# Patient Record
Sex: MALE | Race: WHITE
[De-identification: names, ages, dates, MRNs, and addresses within clinical notes are randomized per-mention and may not be internally consistent; named-entity substitution may affect disease eponyms.]

---

## 2019-03-04 ENCOUNTER — HOSPITAL ENCOUNTER (EMERGENCY)
Dept: HOSPITAL 50 - VM.ED | Age: 39
LOS: 1 days | Discharge: HOME | End: 2019-03-05
Payer: COMMERCIAL

## 2019-03-04 DIAGNOSIS — F17.210: ICD-10-CM

## 2019-03-04 DIAGNOSIS — E86.0: Primary | ICD-10-CM

## 2019-03-04 DIAGNOSIS — I10: ICD-10-CM

## 2019-03-04 DIAGNOSIS — R55: ICD-10-CM

## 2019-03-05 LAB
ANION GAP SERPL CALC-SCNC: 18 MMOL/L (ref 10–20)
CHLORIDE SERPL-SCNC: 99 MMOL/L (ref 98–107)
SODIUM SERPL-SCNC: 138 MMOL/L (ref 136–145)

## 2019-03-05 NOTE — EDM.PDOC
ED HPI GENERAL MEDICAL PROBLEM





- General


Chief Complaint: General


Stated Complaint: HYPERTENSION


Time Seen by Provider: 19 23:12


Source of Information: Reports: Patient


History Limitations: Reports: No Limitations





- History of Present Illness


INITIAL COMMENTS - FREE TEXT/NARRATIVE: 





pt. presents to ER with complaints with lightheadedness and near syncope after 

exiting the shower. He states that he has hypertension that he has not been 

taking his clonidine for (it is ). He states that after he had this 

episode, he took a single dose of 0.1mg clonidine. Denies any chest pain. No 

shortness of breath. He does complain of some continued lightheadedness and 

fatigue.


No nausea or vomiting. He states that he is an alcoholic. He went through 

treatment ([facility] and relapsed about 9 months 

ago. He drinks beer almost nightly. He denies having any symptoms of DTs when 

he doesn't drink. 


Denies any black or tarry stools. 


Onset: Today


Location: Reports: Generalized


Associated Symptoms: Reports: Weakness


  ** Back


Pain Score (Numeric/FACES): 7





- Related Data


 Allergies











Allergy/AdvReac Type Severity Reaction Status Date / Time


 


No Known Allergies Allergy   Verified 19 23:12











Home Meds: 


 Home Meds





cloNIDine [cloNIDine HCl] 0.1 mg PO BID 19 [History]











Past Medical History


Cardiovascular History: Reports: Hypertension





- Past Surgical History


HEENT Surgical History: Reports: Oral Surgery


Musculoskeletal Surgical History: Reports: Other (See Below)


Other Musculoskeletal Surgeries/Procedures:: back surgery





Social & Family History





- Tobacco Use


Smoking Status *Q: Current Every Day Smoker


Years of Tobacco use: 20


Packs/Tins Daily: 1





- Alcohol Use


Days Per Week of Alcohol Use: 7


Number of Drinks Per Day: 6


Total Drinks Per Week: 42





- Recreational Drug Use


Recreational Drug Use: Yes


Recreational Drug Type: Reports: Marijuana/Hashish





ED ROS GENERAL





- Review of Systems


Review Of Systems: See Below


Constitutional: Reports: Weakness, Fatigue


HEENT: Reports: No Symptoms


Respiratory: Reports: No Symptoms


Cardiovascular: Reports: Lightheadedness, Palpitations


Endocrine: Reports: No Symptoms


GI/Abdominal: Reports: No Symptoms


: Reports: No Symptoms


Musculoskeletal: Reports: No Symptoms


Skin: Reports: No Symptoms


Neurological: Reports: No Symptoms


Psychiatric: Reports: No Symptoms


Hematologic/Lymphatic: Reports: No Symptoms


Immunologic: Reports: No Symptoms





ED EXAM, GENERAL





- Physical Exam


Exam: See Below


Exam Limited By: No Limitations


General Appearance: Alert, WD/WN, No Apparent Distress


Eye Exam: Bilateral Eye: EOMI, Normal Fundi, Normal Inspection, PERRL


Nose: Normal Inspection, Normal Mucosa, No Blood


Throat/Mouth: Normal Inspection, Normal Lips, Normal Teeth, Normal Gums, Normal 

Oropharynx, Normal Voice, No Airway Compromise


Head: Atraumatic, Normocephalic


Neck: Normal Inspection, Supple, Non-Tender, Full Range of Motion


Respiratory/Chest: No Respiratory Distress, Lungs Clear, Normal Breath Sounds, 

No Accessory Muscle Use, Chest Non-Tender


Cardiovascular: Normal Peripheral Pulses, Regular Rate, Rhythm, No Edema, No 

Gallop, No JVD, No Murmur, No Rub


Peripheral Pulses: 4+: Radial (R)


GI/Abdominal: Normal Bowel Sounds, Soft, Non-Tender, No Organomegaly, No 

Distention, No Abnormal Bruit, No Mass


 (Male) Exam: Deferred


Rectal (Males) Exam: Deferred


Back Exam: Normal Inspection, Full Range of Motion, NT


Extremities: Normal Inspection, Normal Range of Motion, Non-Tender, Normal 

Capillary Refill, No Pedal Edema


Neurological: Alert, Oriented, CN II-XII Intact, Normal Cognition, Normal Gait, 

Normal Reflexes, No Motor/Sensory Deficits


Psychiatric: Normal Affect, Normal Mood


Skin Exam: Warm, Dry, Intact, Normal Color, No Rash


Lymphatic: No Adenopathy





EKG INTERPRETATION


Rhythm: NSR


Axis: Normal


P-Wave: Present


QRS: Normal


ST-T: Normal


QT: Normal





Course





- Vital Signs


Last Recorded V/S: 





 Last Vital Signs











Temp  36.8 C   19 23:12


 


Pulse  80   19 01:08


 


Resp  14   19 01:08


 


BP  158/96 H  19 01:08


 


Pulse Ox  98   19 00:32














- Orders/Labs/Meds


Orders: 





 Active Orders 24 hr











 Category Date Time Status


 


 EKG Documentation Completion [RC] STAT Care  19 23:24 Active


 


 Chest 1V Frontal [CR] Stat Exams  19 23:24 Taken


 


 Peripheral IV Insertion Adult [OM.PC] Routine Oth  19 23:25 Ordered











Labs: 





 Laboratory Tests











  19 Range/Units





  23:55 23:55 23:55 


 


WBC  8.5    (4.0-10.0)  x10^3/uL


 


RBC  5.26    (4.5-6.0)  x10^6/uL


 


Hgb  16.8    (14.0-18.0)  g/dL


 


Hct  48.3    (40.0-52.0)  %


 


MCV  91.8    (78.0-93.0)  fL


 


MCH  31.9    (26.0-32.0)  pg


 


MCHC  34.8    (32.0-36.0)  g/dL


 


RDW Coeff of Ivy  12.0    (10.0-15.0)  %


 


Plt Count  218    (130-400)  x10^3/uL


 


Neut % (Auto)  71.5    (50.0-80.0)  %


 


Lymph % (Auto)  20.9 L    (25.0-50.0)  %


 


Mono % (Auto)  7.0    (2.0-11.0)  %


 


Eos % (Auto)  0.0    (0.0-4.0)  %


 


Baso % (Auto)  0.6    (0.2-1.2)  %


 


PT   10.4   (9.6-11.4)  SEC


 


INR   1.0 L   (2.0-3.5)  


 


Sodium    138  (136-145)  mmol/L


 


Potassium    4.0  (3.5-5.1)  mmol/L


 


Chloride    99  ()  mmol/L


 


Carbon Dioxide    25  (21-32)  mmol/L


 


Anion Gap    18.0  (10-20)  mmol/L


 


BUN    11  (7-18)  mg/dL


 


Creatinine    0.9  (0.70-1.30)  mg/dL


 


Est Cr Clr Drug Dosing    TNP  


 


Estimated GFR (MDRD)    > 60  


 


Glucose    101  ()  mg/dL


 


Calcium    9.6  (8.5-10.1)  mg/dL


 


Corrected Calcium    9.20  (8.5-10.1)  mg/dL


 


Phosphorus    4.0  (2.6-4.7)  mg/dL


 


Magnesium    2.1  (1.8-2.4)  mg/dL


 


Total Bilirubin    0.9  (0.2-1.0)  mg/dL


 


AST    21  (15-37)  U/L


 


ALT    18  (16-63)  U/L


 


Alkaline Phosphatase    79  ()  U/L


 


Troponin I    < 0.017  (<=0.056)  ng/mL


 


Total Protein    8.3 H  (6.4-8.2)  g/dL


 


Albumin    4.5  (3.4-5.0)  g/dL


 


Globulin    3.8  


 


Albumin/Globulin Ratio    1.18  


 


Urine Color     (YELLOW)  


 


Urine Appearance     (CLEAR)  


 


Urine pH     (5.0-8.0)  


 


Ur Specific Gravity     


 


Urine Protein     (NEGATIVE)  mg/dL


 


Urine Glucose (UA)     (NEGATIVE)  mg/dL


 


Urine Ketones     (NEGATIVE)  mg/dL


 


Urine Occult Blood     (NEGATIVE)  


 


Urine Nitrite     (NEGATIVE)  


 


Urine Bilirubin     (NEGATIVE)  


 


Urine Urobilinogen     (0.2)  EU/dL


 


Ur Leukocyte Esterase     (NEGATIVE)  


 


Urine RBC     (NOT SEEN)  /HPF


 


Urine WBC     (NOT SEEN)  /HPF


 


Ur Squamous Epith Cells     (NEGATIVE)  /HPF


 


Urine Bacteria     (NEGATIVE)  /HPF


 


Urine Mucus     (NEGATIVE)  /LPF














  19 Range/Units





  00:31 


 


WBC   (4.0-10.0)  x10^3/uL


 


RBC   (4.5-6.0)  x10^6/uL


 


Hgb   (14.0-18.0)  g/dL


 


Hct   (40.0-52.0)  %


 


MCV   (78.0-93.0)  fL


 


MCH   (26.0-32.0)  pg


 


MCHC   (32.0-36.0)  g/dL


 


RDW Coeff of Ivy   (10.0-15.0)  %


 


Plt Count   (130-400)  x10^3/uL


 


Neut % (Auto)   (50.0-80.0)  %


 


Lymph % (Auto)   (25.0-50.0)  %


 


Mono % (Auto)   (2.0-11.0)  %


 


Eos % (Auto)   (0.0-4.0)  %


 


Baso % (Auto)   (0.2-1.2)  %


 


PT   (9.6-11.4)  SEC


 


INR   (2.0-3.5)  


 


Sodium   (136-145)  mmol/L


 


Potassium   (3.5-5.1)  mmol/L


 


Chloride   ()  mmol/L


 


Carbon Dioxide   (21-32)  mmol/L


 


Anion Gap   (10-20)  mmol/L


 


BUN   (7-18)  mg/dL


 


Creatinine   (0.70-1.30)  mg/dL


 


Est Cr Clr Drug Dosing   


 


Estimated GFR (MDRD)   


 


Glucose   ()  mg/dL


 


Calcium   (8.5-10.1)  mg/dL


 


Corrected Calcium   (8.5-10.1)  mg/dL


 


Phosphorus   (2.6-4.7)  mg/dL


 


Magnesium   (1.8-2.4)  mg/dL


 


Total Bilirubin   (0.2-1.0)  mg/dL


 


AST   (15-37)  U/L


 


ALT   (16-63)  U/L


 


Alkaline Phosphatase   ()  U/L


 


Troponin I   (<=0.056)  ng/mL


 


Total Protein   (6.4-8.2)  g/dL


 


Albumin   (3.4-5.0)  g/dL


 


Globulin   


 


Albumin/Globulin Ratio   


 


Urine Color  Dark yellow H  (YELLOW)  


 


Urine Appearance  Slightly cloudy H  (CLEAR)  


 


Urine pH  7.0  (5.0-8.0)  


 


Ur Specific Gravity  1.020  


 


Urine Protein  Negative  (NEGATIVE)  mg/dL


 


Urine Glucose (UA)  Negative  (NEGATIVE)  mg/dL


 


Urine Ketones  40 H  (NEGATIVE)  mg/dL


 


Urine Occult Blood  Negative  (NEGATIVE)  


 


Urine Nitrite  Negative  (NEGATIVE)  


 


Urine Bilirubin  Small H  (NEGATIVE)  


 


Urine Urobilinogen  0.2  (0.2)  EU/dL


 


Ur Leukocyte Esterase  Negative  (NEGATIVE)  


 


Urine RBC  Not seen  (NOT SEEN)  /HPF


 


Urine WBC  0-5  (NOT SEEN)  /HPF


 


Ur Squamous Epith Cells  Not seen  (NEGATIVE)  /HPF


 


Urine Bacteria  Few H  (NEGATIVE)  /HPF


 


Urine Mucus  Few H  (NEGATIVE)  /LPF











Meds: 





Medications














Discontinued Medications














Generic Name Dose Route Start Last Admin





  Trade Name Freq  PRN Reason Stop Dose Admin


 


Clonidine HCl  0.1 mg  19 00:35  19 00:38





  Catapres  PO  19 00:36  0.1 mg





  ONETIME ONE   Administration





     





     





     





     


 


Sodium Chloride  1,000 mls @ 1,000 mls/hr  19 23:25  19 23:33





  Normal Saline  IV  19 00:24  1,000 mls/hr





  .BOLUS ONE   Administration





     





     





     





     


 


Sodium Chloride  10 ml  19 23:25  





  Saline Flush  FLUSH   





  ASDIRECTED PRN   





  Keep Vein Open   





     





     





     














- Radiology Interpretation


Free Text/Narrative:: 





negative





Departure





- Departure


Time of Disposition: 01:14


Disposition: Home, Self-Care 01


Clinical Impression: 


 Near syncope, Dehydration, Hypertension








- Discharge Information


Instructions:  Hypertension, Easy-to-Read, Syncope, Dehydration, Adult


Referrals: 


PCP,Unobtain [Primary Care Provider] - 


Forms:  ED Department Discharge


Additional Instructions: 


Home to rest. 





Start clonidine 0.1mg once daily





Follow-up in clinic for recheck of BP in 5-10 days





Return to ER if you have any chest pain, shortness of breath, or 

lightheadedness.





- My Orders


Last 24 Hours: 





My Active Orders





19 23:24


EKG Documentation Completion [RC] STAT 


Chest 1V Frontal [CR] Stat 





19 23:25


Peripheral IV Insertion Adult [OM.PC] Routine 














- Assessment/Plan


Last 24 Hours: 





My Active Orders





19 23:24


EKG Documentation Completion [RC] STAT 


Chest 1V Frontal [CR] Stat 





19 23:25


Peripheral IV Insertion Adult [OM.PC] Routine 











Plan: 


Home to rest. 





Start clonidine 0.1mg once daily





Follow-up in clinic for recheck of BP in 5-10 days





Return to ER if you have any chest pain, shortness of breath, or 

lightheadedness.

## 2019-03-05 NOTE — CR
______________________________________________________________________________   

  

[id number] RAD/RAD Chest PA or AP 1V  

EXAM: FRONTAL CHEST  

   

 INDICATION: Syncope.  

   

 COMPARISON: July 26, 2015.  

   

 DISCUSSION: The heart and lungs are normal in appearance. Posterior fusion  

 hardware mid to lower thoracic spine.  

   

 IMPRESSION:    

 1.  Negative exam.  

   

 Electronically signed by Hoang Santiago MD on 3/5/2019 8:07 AM  

   

  

Hoang Santiago MD                 

 03/05/19 0809    

  

Thank you for allowing us to participate in the care of your patient.

## 2020-11-15 NOTE — EDM.PDOC
ED HPI GENERAL MEDICAL PROBLEM





- General


Chief Complaint: Upper Extremity Injury/Pain


Stated Complaint: POSSIBLE BROKEN L ELBOW


Time Seen by Provider: 11/15/20 21:13


Source of Information: Reports: Patient





- History of Present Illness


INITIAL COMMENTS - FREE TEXT/NARRATIVE: 





Tien is a 40 y/o0 male who comes to the ER with complaints of left elbow pain

and swelling. His hand is also tingling and feels "funny". He was drinking last 

night at a friend's house in [place] and about 2330 he tripped a fell backwards on

a porch and caught his left elbow on a brick flower pot. The arm did hurt, but 

he went to sleep later and slept all night, but the arm continued to hurt today.

He has used marijuana last 2 hours ago for pain control, although he is a daily 

user. Denies any other injuries.


He had apparently checked into one of the [place] ERs, but got frustrated waiting 

and then left for home which is in [place]. On the drive here, his left arm 

was painful with movement and his left hand was numb and tingly again so he 

figured he better stop at the ER here for evaluation.


  ** Left Elbow


Pain Score (Numeric/FACES): 5





- Related Data


                                    Allergies











Allergy/AdvReac Type Severity Reaction Status Date / Time


 


No Known Allergies Allergy   Verified 11/15/20 20:57











Home Meds: 


                                    Home Meds





. [No Known Home Meds]  11/15/20 [History]











Past Medical History


Cardiovascular History: Reports: Hypertension





- Past Surgical History


HEENT Surgical History: Reports: Oral Surgery


Musculoskeletal Surgical History: Reports: Other (See Below)


Other Musculoskeletal Surgeries/Procedures:: back surgery





Social & Family History





- Tobacco Use


Tobacco Use Status *Q: Current Every Day Tobacco User


Years of Tobacco use: 23


Packs/Tins Daily: 1.5





- Alcohol Use


Days Per Week of Alcohol Use: 7


Number of Drinks Per Day: 12


Total Drinks Per Week: 84


Date of Last Drink: 11/15/20


Time of Last Drink: 19:00





- Recreational Drug Use


Recreational Drug Use: Yes


Recreational Drug Type: Reports: Marijuana/Hashish


Recreational Drug Use Frequency: Daily





Review of Systems





- Review of Systems


Review Of Systems: See Below


Constitutional: Reports: No Symptoms


Eyes: Reports: No Symptoms


Ears: Reports: No Symptoms


Nose: Reports: No Symptoms


Mouth/Throat: Reports: No Symptoms


Respiratory: Reports: No Symptoms


Cardiovascular: Reports: No Symptoms


GI/Abdominal: Reports: No Symptoms


Genitourinary: Reports: No Symptoms


Musculoskeletal: Reports: Joint Pain (Left elbow)


Skin: Reports: No Symptoms


Neurological: Reports: Paresthesia (left hand/arm), Tingling (left hand/arm)


Psychiatric: Reports: No Symptoms





ED EXAM, GENERAL





- Physical Exam


Exam: See Below


General Appearance: Alert, WD/WN, No Apparent Distress (Adult male)


Throat/Mouth: Normal Voice


Head: Atraumatic, Normocephalic, Other (Grossly intant)


Neck: Normal Inspection


Respiratory/Chest: No Respiratory Distress


Cardiovascular: Regular Rate, Rhythm


Peripheral Pulses: 3+: Radial (L)


GI/Abdominal: Soft


 (Male) Exam: Deferred


Rectal (Males) Exam: Deferred


Back Exam: Other (not examined)


Extremities: Joint Swelling (left elbow swelling on posterior aspect)


Neurological: Alert, Oriented, CN II-XII Intact, No Motor/Sensory Deficits


Psychiatric: Normal Affect, Normal Mood


Skin Exam: Warm, Dry, Intact, Normal Color





Course





- Vital Signs


Text/Narrative:: 





2113 The patient was seen by the CNP. Xray was ordered. He denied need for pain 

meds at this time.





2240 Xray reviewed, no fx noted. Discussed findings with pt. ACE wrap applied to

 his left elbow. He was given discharge instructions and left the ER in stable 

condition.


Last Recorded V/S: 


                                Last Vital Signs











Temp  36.3 C   11/15/20 20:58


 


Pulse  100   11/15/20 20:58


 


Resp  16   11/15/20 20:58


 


BP  170/112 H  11/15/20 21:06


 


Pulse Ox  96   11/15/20 20:58














- Orders/Labs/Meds


Orders: 


                               Active Orders 24 hr











 Category Date Time Status


 


 Elbow Min 3V Lt [CR] Stat Exams  11/15/20 21:18 Taken














- Radiology Interpretation


Free Text/Narrative:: 





XR Left Elbow=no acute fx noted (See final report)





Departure





- Departure


Time of Disposition: 22:41


Disposition: Home, Self-Care 01


Condition: Good


Clinical Impression: 


 Left elbow pain





Elbow contusion


Qualifiers:


 Encounter type: initial encounter Laterality: left Qualified Code(s): S50.02XA 

- Contusion of left elbow, initial encounter








- Discharge Information


Instructions:  How to Use Cold Therapy, Easy-to-Read, Elbow Contusion


Referrals: 


Jovon Lerner MD [Primary Care Provider] - 


Forms:  ED Department Discharge


Additional Instructions: 


-Wear the ACE as needed to help with swelling and reduce pain





-You may use acetaminophen or ibuprofen as needed for pain. 





-Ice as needed





-If the pain persists in the next 2-3 weeks, you may need to follow up with your

PCP to have further diagnostic imaging. Sometimes fractures do not immediately 

show up on an xray.





-Return to the ER as needed





Sepsis Event Note (ED)





- Evaluation


Sepsis Screening Result: No Definite Risk





- Focused Exam


Vital Signs: 


                                   Vital Signs











  Temp Pulse Resp BP Pulse Ox


 


 11/15/20 21:06     170/112 H 


 


 11/15/20 20:58  36.3 C  100  16  221/128 H  96














- My Orders


Last 24 Hours: 


My Active Orders





11/15/20 21:18


Elbow Min 3V Lt [CR] Stat 














- Assessment/Plan


Last 24 Hours: 


My Active Orders





11/15/20 21:18


Elbow Min 3V Lt [CR] Stat 











Assessment:: 





1)Left Elbow Pain


2)Left Elbow Contusion


Plan: 





-Wear the ACE as needed to help with swelling and reduce pain





-You may use acetaminophen or ibuprofen as needed for pain. 





-Ice as needed





-If the pain persists in the next 2-3 weeks, you may need to follow up with your

 PCP to have further diagnostic imaging. Sometimes fractures do not immediately 

show up on an xray.





-Return to the ER as needed

## 2020-11-16 NOTE — CR
______________________________________________________________________________   

  

[id number] RAD/RAD Elbow Left 3V Min  

EXAM:   

   

 RAD Elbow Left 3V Min  

   

 CLINICAL DATA:   

   

 TRAUMA  

   

 COMPARISON:   

   

 NO PREVIOUS SIMILAR EXAM IS AVAILABLE.  

   

 FINDINGS:   

   

 No fracture or dislocation is seen.  

   

 There is no radiopaque foreign body in the soft tissues.  

   

 There is no air in the soft tissues.  

   

 There is no cortical thickening or periosteal reaction either.  

   

 IMPRESSION:  

   

 NEGATIVE PLAIN FILM EXAM.  

   

 Electronically signed by Travon Butts MD on 11/16/2020 7:02 AM  

   

  

Travon Butts MD                 

 11/16/20 0704    

  

Thank you for allowing us to participate in the care of your patient.